# Patient Record
Sex: MALE | Race: WHITE
[De-identification: names, ages, dates, MRNs, and addresses within clinical notes are randomized per-mention and may not be internally consistent; named-entity substitution may affect disease eponyms.]

---

## 2018-05-07 ENCOUNTER — HOSPITAL ENCOUNTER (OUTPATIENT)
Dept: HOSPITAL 47 - RADCTMAIN | Age: 73
Discharge: HOME | End: 2018-05-07
Payer: MEDICARE

## 2018-05-07 DIAGNOSIS — R91.8: ICD-10-CM

## 2018-05-07 DIAGNOSIS — J84.10: Primary | ICD-10-CM

## 2018-05-07 LAB — BUN SERPL-SCNC: 16 MG/DL (ref 9–20)

## 2018-05-07 PROCEDURE — 84520 ASSAY OF UREA NITROGEN: CPT

## 2018-05-07 PROCEDURE — 71260 CT THORAX DX C+: CPT

## 2018-05-07 PROCEDURE — 82565 ASSAY OF CREATININE: CPT

## 2018-05-07 PROCEDURE — 36415 COLL VENOUS BLD VENIPUNCTURE: CPT

## 2018-05-07 NOTE — CT
EXAMINATION TYPE: CT chest w con

 

DATE OF EXAM: 5/7/2018

 

COMPARISON: Chest x-ray 5/1/2018

 

HISTORY: Lung nodules

 

CT DLP: 407.6 mGycm

Automated exposure control for dose reduction was used.

 

CONTRAST: 

CT scan of the chest is performed with IV Contrast, patient injected with 100 mL of Isovue 300.

 

FINDINGS:

 

LUNGS: There is a calcified nodule right lower lobe compatible with a granuloma. Subsegmental consoli
dation at the left lung base noted is a somewhat nodular pattern. Again is more likely related atelec
tasis mass. Short-term follow-up recommended is ill-defined density in the left lung apex is somewhat
 irregular in appearance adrenal a 2 mm in diameter. Extends a length of 2 cm. This could be related 
to scar or atelectasis. Malignancy not entirely excluded.

 

No consolidative pneumonia, pleural effusion or pneumothorax.

 

MEDIASTINUM: Aorta of normal caliber. No pathologic adenopathy identified. Calcified lymph nodes in t
he hilum noted in the heart is mildly enlarged. Minimal coronary artery calcification noted. 

 

OTHER:  Hypertrophic and degenerative change of the spine. There are a few scattered areas of focal s
clerosis involving the vertebral column which are nonspecific.

 

IMPRESSION:  

1. Calcified granuloma right lower lobe

2. Somewhat nodular area of consolidation left lung base may be related atelectasis rather than neopl
asm given the morphology. Neoplasm not excluded. Recommend short-term follow-up 3 month CT scan to co
nfirm stability otherwise consider PET scan.

3. Linear somewhat irregular density left upper lobe measuring 2 mm in diameter extending a length of
 2 cm is most likely related to an area of scar or linear atelectasis. This also should be followed o
n short-term basis 3 month CT.

## 2019-04-15 ENCOUNTER — HOSPITAL ENCOUNTER (OUTPATIENT)
Dept: HOSPITAL 47 - RADCTMAIN | Age: 74
Discharge: HOME | End: 2019-04-15
Attending: INTERNAL MEDICINE
Payer: COMMERCIAL

## 2019-04-15 DIAGNOSIS — R91.8: Primary | ICD-10-CM

## 2019-04-15 LAB — BUN SERPL-SCNC: 20 MG/DL (ref 9–20)

## 2019-04-15 PROCEDURE — 36415 COLL VENOUS BLD VENIPUNCTURE: CPT

## 2019-04-15 PROCEDURE — 84520 ASSAY OF UREA NITROGEN: CPT

## 2019-04-15 PROCEDURE — 71260 CT THORAX DX C+: CPT

## 2019-04-15 PROCEDURE — 82565 ASSAY OF CREATININE: CPT

## 2019-04-15 NOTE — CT
EXAMINATION TYPE: CT chest w con

 

DATE OF EXAM: 4/15/2019

 

COMPARISON: Prior chest CT May 7, 2018

 

HISTORY: Follow up lung nodule.

 

CT DLP: 439.6 mGycm.   Automated Exposure Control for Dose Reduction was Utilized.

 

 

TECHNIQUE:  CT scan of the thorax is performed following with IV Contrast, patient injected with 100 
mL of Isovue 300.

 

FINDINGS:

 

LUNGS: There is persistent mild to minimal biapical pleural/parenchymal scarring most prominent poste
riorly in the left lung apex where there are scarlike opacity axial image 15 unchanged from prior lucia
dy presumed postinflammatory. There is additional posterior left basilar linear scarring which become
s more nodule in appearance just above diaphragm no significant change from prior. No pleural effusio
n or pneumothorax is evident bilaterally. No suspicious new noncalcified nodules or masses. Stable ca
lcified 5 mm nodule or granuloma right middle lobe axial image 40.

 

MEDIASTINUM: There are no new greater than 1 cm noncalcified hilar or mediastinal lymph nodes. Subcen
timeter calcified anterior right hilar lymph nodes are redemonstrated. No cardiomegaly is seen.  Stab
le trace pericardial effusion.

 

OTHER: Small degree of subareolar gynecomastia is redemonstrated. Splenomegaly is redemonstrated tania
uring 14.9 cm long axis axial image 59. Moderate to severe multilevel spurring in the thoracic spine 
is redemonstrated.

 

IMPRESSION: Overall stable findings, evidence of old granulomatous disease. Scanner linear and slight
ly nodular scarring presumed postinflammatory given interval stability. No new greater than 5 mm pulm
onary nodules or masses.